# Patient Record
Sex: MALE | Employment: FULL TIME | URBAN - METROPOLITAN AREA
[De-identification: names, ages, dates, MRNs, and addresses within clinical notes are randomized per-mention and may not be internally consistent; named-entity substitution may affect disease eponyms.]

---

## 2018-05-12 ENCOUNTER — OFFICE VISIT (OUTPATIENT)
Dept: FAMILY MEDICINE CLINIC | Facility: CLINIC | Age: 58
End: 2018-05-12

## 2018-05-12 VITALS
SYSTOLIC BLOOD PRESSURE: 140 MMHG | RESPIRATION RATE: 16 BRPM | DIASTOLIC BLOOD PRESSURE: 62 MMHG | HEART RATE: 84 BPM | BODY MASS INDEX: 28.49 KG/M2 | OXYGEN SATURATION: 96 % | WEIGHT: 215 LBS | HEIGHT: 73 IN | TEMPERATURE: 98 F

## 2018-05-12 DIAGNOSIS — J40 BRONCHITIS: Primary | ICD-10-CM

## 2018-05-12 PROCEDURE — 99213 OFFICE O/P EST LOW 20 MIN: CPT | Performed by: NURSE PRACTITIONER

## 2018-05-12 RX ORDER — PREDNISONE 20 MG/1
20 TABLET ORAL DAILY
Qty: 5 TABLET | Refills: 0 | Status: SHIPPED | OUTPATIENT
Start: 2018-05-12 | End: 2018-05-17

## 2018-05-12 RX ORDER — ALBUTEROL SULFATE 90 UG/1
2 AEROSOL, METERED RESPIRATORY (INHALATION) EVERY 4 HOURS PRN
Qty: 1 INHALER | Refills: 6 | Status: SHIPPED | OUTPATIENT
Start: 2018-05-12

## 2018-05-12 NOTE — PROGRESS NOTES
CHIEF COMPLAINT:   Patient presents with:  Cough: prod cough, congestion, wheezing , SOB x 2 wks . tried otc mucinex dm helped a little         HPI:   Echo Nixon is a 56year old male who presents for cough for  2  weeks.   Cough started gradually and • Hypercholesteremia    • Hypogonadism male    • Hypothyroid       Social History:  Smoking status: Never Smoker                                                              Smokeless tobacco: Never Used                      Alcohol use:  Yes              C Sig: Take 1 tablet (20 mg total) by mouth daily. Albuterol Sulfate HFA (PROAIR HFA) 108 (90 Base) MCG/ACT Inhalation Aero Soln 1 Inhaler 6      Sig: Inhale 2 puffs into the lungs every 4 (four) hours as needed for Wheezing.              Patient Inst · Your appetite may be poor, so a light diet is fine. Avoid dehydration by drinking 6 to 8 glasses of fluids per day (such as water, soft drinks, sports drinks, juices, tea, or soup). Extra fluids will help loosen secretions in the nose and lungs.   · Over-